# Patient Record
Sex: MALE | Race: WHITE | NOT HISPANIC OR LATINO | URBAN - METROPOLITAN AREA
[De-identification: names, ages, dates, MRNs, and addresses within clinical notes are randomized per-mention and may not be internally consistent; named-entity substitution may affect disease eponyms.]

---

## 2017-02-10 ENCOUNTER — OUTPATIENT (OUTPATIENT)
Dept: OUTPATIENT SERVICES | Facility: HOSPITAL | Age: 52
LOS: 1 days | End: 2017-02-10
Payer: COMMERCIAL

## 2017-02-10 DIAGNOSIS — Z01.818 ENCOUNTER FOR OTHER PREPROCEDURAL EXAMINATION: ICD-10-CM

## 2017-02-10 DIAGNOSIS — Z01.812 ENCOUNTER FOR PREPROCEDURAL LABORATORY EXAMINATION: ICD-10-CM

## 2017-02-10 DIAGNOSIS — Z01.810 ENCOUNTER FOR PREPROCEDURAL CARDIOVASCULAR EXAMINATION: ICD-10-CM

## 2017-02-10 DIAGNOSIS — Z30.2 ENCOUNTER FOR STERILIZATION: ICD-10-CM

## 2017-02-10 DIAGNOSIS — E11.9 TYPE 2 DIABETES MELLITUS WITHOUT COMPLICATIONS: ICD-10-CM

## 2017-02-10 LAB
ANION GAP SERPL CALC-SCNC: 18 MMOL/L — HIGH (ref 5–17)
BUN SERPL-MCNC: 21 MG/DL — SIGNIFICANT CHANGE UP (ref 7–23)
CALCIUM SERPL-MCNC: 10.5 MG/DL — SIGNIFICANT CHANGE UP (ref 8.4–10.5)
CHLORIDE SERPL-SCNC: 92 MMOL/L — LOW (ref 96–108)
CO2 SERPL-SCNC: 25 MMOL/L — SIGNIFICANT CHANGE UP (ref 22–31)
CREAT SERPL-MCNC: 0.91 MG/DL — SIGNIFICANT CHANGE UP (ref 0.5–1.3)
GLUCOSE SERPL-MCNC: 202 MG/DL — HIGH (ref 70–99)
HBA1C BLD-MCNC: 8.9 % — HIGH (ref 4–5.6)
HCT VFR BLD CALC: 44 % — SIGNIFICANT CHANGE UP (ref 39–50)
HGB BLD-MCNC: 15.3 G/DL — SIGNIFICANT CHANGE UP (ref 13–17)
MCHC RBC-ENTMCNC: 28.4 PG — SIGNIFICANT CHANGE UP (ref 27–34)
MCHC RBC-ENTMCNC: 34.8 GM/DL — SIGNIFICANT CHANGE UP (ref 32–36)
MCV RBC AUTO: 81.6 FL — SIGNIFICANT CHANGE UP (ref 80–100)
PLATELET # BLD AUTO: 345 K/UL — SIGNIFICANT CHANGE UP (ref 150–400)
POTASSIUM SERPL-MCNC: 4.4 MMOL/L — SIGNIFICANT CHANGE UP (ref 3.5–5.3)
POTASSIUM SERPL-SCNC: 4.4 MMOL/L — SIGNIFICANT CHANGE UP (ref 3.5–5.3)
RBC # BLD: 5.39 M/UL — SIGNIFICANT CHANGE UP (ref 4.2–5.8)
RBC # FLD: 12.5 % — SIGNIFICANT CHANGE UP (ref 10.3–14.5)
SODIUM SERPL-SCNC: 135 MMOL/L — SIGNIFICANT CHANGE UP (ref 135–145)
WBC # BLD: 7.24 K/UL — SIGNIFICANT CHANGE UP (ref 3.8–10.5)
WBC # FLD AUTO: 7.24 K/UL — SIGNIFICANT CHANGE UP (ref 3.8–10.5)

## 2017-02-10 PROCEDURE — 36415 COLL VENOUS BLD VENIPUNCTURE: CPT

## 2017-02-10 PROCEDURE — 85027 COMPLETE CBC AUTOMATED: CPT

## 2017-02-10 PROCEDURE — 83036 HEMOGLOBIN GLYCOSYLATED A1C: CPT

## 2017-02-10 PROCEDURE — 80048 BASIC METABOLIC PNL TOTAL CA: CPT

## 2017-02-10 PROCEDURE — G0463: CPT

## 2017-02-17 ENCOUNTER — OUTPATIENT (OUTPATIENT)
Dept: OUTPATIENT SERVICES | Facility: HOSPITAL | Age: 52
LOS: 1 days | End: 2017-02-17
Payer: COMMERCIAL

## 2017-02-17 DIAGNOSIS — I24.9 ACUTE ISCHEMIC HEART DISEASE, UNSPECIFIED: ICD-10-CM

## 2017-02-17 PROCEDURE — 93017 CV STRESS TEST TRACING ONLY: CPT

## 2017-02-17 PROCEDURE — A9502: CPT

## 2017-02-17 PROCEDURE — 78452 HT MUSCLE IMAGE SPECT MULT: CPT

## 2017-03-02 ENCOUNTER — INPATIENT (INPATIENT)
Facility: HOSPITAL | Age: 52
LOS: 0 days | Discharge: HOME | DRG: 246 | End: 2017-03-03
Attending: INTERNAL MEDICINE | Admitting: INTERNAL MEDICINE
Payer: COMMERCIAL

## 2017-03-02 VITALS
DIASTOLIC BLOOD PRESSURE: 57 MMHG | TEMPERATURE: 98 F | HEIGHT: 69 IN | HEART RATE: 59 BPM | SYSTOLIC BLOOD PRESSURE: 119 MMHG | WEIGHT: 175.05 LBS | OXYGEN SATURATION: 98 % | RESPIRATION RATE: 18 BRPM

## 2017-03-02 DIAGNOSIS — R94.39 ABNORMAL RESULT OF OTHER CARDIOVASCULAR FUNCTION STUDY: ICD-10-CM

## 2017-03-02 DIAGNOSIS — Z30.2 ENCOUNTER FOR STERILIZATION: ICD-10-CM

## 2017-03-02 DIAGNOSIS — Z01.818 ENCOUNTER FOR OTHER PREPROCEDURAL EXAMINATION: ICD-10-CM

## 2017-03-02 DIAGNOSIS — H53.009 UNSPECIFIED AMBLYOPIA, UNSPECIFIED EYE: Chronic | ICD-10-CM

## 2017-03-02 LAB
ALBUMIN SERPL ELPH-MCNC: 4.7 G/DL — SIGNIFICANT CHANGE UP (ref 3.3–5)
ALP SERPL-CCNC: 56 U/L — SIGNIFICANT CHANGE UP (ref 40–120)
ALT FLD-CCNC: 33 U/L RC — SIGNIFICANT CHANGE UP (ref 10–45)
ANION GAP SERPL CALC-SCNC: 14 MMOL/L — SIGNIFICANT CHANGE UP (ref 5–17)
AST SERPL-CCNC: 33 U/L — SIGNIFICANT CHANGE UP (ref 10–40)
BILIRUB SERPL-MCNC: 1 MG/DL — SIGNIFICANT CHANGE UP (ref 0.2–1.2)
BUN SERPL-MCNC: 18 MG/DL — SIGNIFICANT CHANGE UP (ref 7–23)
CALCIUM SERPL-MCNC: 9.4 MG/DL — SIGNIFICANT CHANGE UP (ref 8.4–10.5)
CHLORIDE SERPL-SCNC: 99 MMOL/L — SIGNIFICANT CHANGE UP (ref 96–108)
CO2 SERPL-SCNC: 28 MMOL/L — SIGNIFICANT CHANGE UP (ref 22–31)
CREAT SERPL-MCNC: 1 MG/DL — SIGNIFICANT CHANGE UP (ref 0.5–1.3)
GLUCOSE SERPL-MCNC: 126 MG/DL — HIGH (ref 70–99)
HCT VFR BLD CALC: 44.8 % — SIGNIFICANT CHANGE UP (ref 39–50)
HGB BLD-MCNC: 15.3 G/DL — SIGNIFICANT CHANGE UP (ref 13–17)
MCHC RBC-ENTMCNC: 29.1 PG — SIGNIFICANT CHANGE UP (ref 27–34)
MCHC RBC-ENTMCNC: 34.2 GM/DL — SIGNIFICANT CHANGE UP (ref 32–36)
MCV RBC AUTO: 84.9 FL — SIGNIFICANT CHANGE UP (ref 80–100)
PLATELET # BLD AUTO: 288 K/UL — SIGNIFICANT CHANGE UP (ref 150–400)
POTASSIUM SERPL-MCNC: 4.1 MMOL/L — SIGNIFICANT CHANGE UP (ref 3.5–5.3)
POTASSIUM SERPL-SCNC: 4.1 MMOL/L — SIGNIFICANT CHANGE UP (ref 3.5–5.3)
PROT SERPL-MCNC: 7.1 G/DL — SIGNIFICANT CHANGE UP (ref 6–8.3)
RBC # BLD: 5.28 M/UL — SIGNIFICANT CHANGE UP (ref 4.2–5.8)
RBC # FLD: 11.7 % — SIGNIFICANT CHANGE UP (ref 10.3–14.5)
SODIUM SERPL-SCNC: 141 MMOL/L — SIGNIFICANT CHANGE UP (ref 135–145)
WBC # BLD: 6 K/UL — SIGNIFICANT CHANGE UP (ref 3.8–10.5)
WBC # FLD AUTO: 6 K/UL — SIGNIFICANT CHANGE UP (ref 3.8–10.5)

## 2017-03-02 PROCEDURE — 93010 ELECTROCARDIOGRAM REPORT: CPT

## 2017-03-02 RX ORDER — DEXTROSE 50 % IN WATER 50 %
25 SYRINGE (ML) INTRAVENOUS ONCE
Qty: 0 | Refills: 0 | Status: DISCONTINUED | OUTPATIENT
Start: 2017-03-02 | End: 2017-03-03

## 2017-03-02 RX ORDER — SODIUM CHLORIDE 9 MG/ML
3 INJECTION INTRAMUSCULAR; INTRAVENOUS; SUBCUTANEOUS EVERY 8 HOURS
Qty: 0 | Refills: 0 | Status: DISCONTINUED | OUTPATIENT
Start: 2017-03-02 | End: 2017-03-03

## 2017-03-02 RX ORDER — GLUCAGON INJECTION, SOLUTION 0.5 MG/.1ML
1 INJECTION, SOLUTION SUBCUTANEOUS ONCE
Qty: 0 | Refills: 0 | Status: DISCONTINUED | OUTPATIENT
Start: 2017-03-02 | End: 2017-03-03

## 2017-03-02 RX ORDER — LISINOPRIL 2.5 MG/1
10 TABLET ORAL DAILY
Qty: 0 | Refills: 0 | Status: DISCONTINUED | OUTPATIENT
Start: 2017-03-02 | End: 2017-03-03

## 2017-03-02 RX ORDER — PRASUGREL 5 MG/1
10 TABLET, FILM COATED ORAL ONCE
Qty: 0 | Refills: 0 | Status: COMPLETED | OUTPATIENT
Start: 2017-03-03 | End: 2017-03-03

## 2017-03-02 RX ORDER — SODIUM CHLORIDE 9 MG/ML
1000 INJECTION, SOLUTION INTRAVENOUS
Qty: 0 | Refills: 0 | Status: DISCONTINUED | OUTPATIENT
Start: 2017-03-02 | End: 2017-03-03

## 2017-03-02 RX ORDER — PRASUGREL 5 MG/1
1 TABLET, FILM COATED ORAL
Qty: 90 | Refills: 3 | OUTPATIENT
Start: 2017-03-02 | End: 2018-02-24

## 2017-03-02 RX ORDER — INSULIN LISPRO 100/ML
VIAL (ML) SUBCUTANEOUS
Qty: 0 | Refills: 0 | Status: DISCONTINUED | OUTPATIENT
Start: 2017-03-02 | End: 2017-03-03

## 2017-03-02 RX ORDER — ASPIRIN/CALCIUM CARB/MAGNESIUM 324 MG
81 TABLET ORAL DAILY
Qty: 0 | Refills: 0 | Status: DISCONTINUED | OUTPATIENT
Start: 2017-03-02 | End: 2017-03-03

## 2017-03-02 RX ORDER — DEXTROSE 50 % IN WATER 50 %
1 SYRINGE (ML) INTRAVENOUS ONCE
Qty: 0 | Refills: 0 | Status: DISCONTINUED | OUTPATIENT
Start: 2017-03-02 | End: 2017-03-03

## 2017-03-02 RX ORDER — INSULIN GLARGINE 100 [IU]/ML
24 INJECTION, SOLUTION SUBCUTANEOUS AT BEDTIME
Qty: 0 | Refills: 0 | Status: DISCONTINUED | OUTPATIENT
Start: 2017-03-02 | End: 2017-03-03

## 2017-03-02 RX ORDER — METOPROLOL TARTRATE 50 MG
25 TABLET ORAL DAILY
Qty: 0 | Refills: 0 | Status: DISCONTINUED | OUTPATIENT
Start: 2017-03-02 | End: 2017-03-03

## 2017-03-02 RX ORDER — DEXTROSE 50 % IN WATER 50 %
12.5 SYRINGE (ML) INTRAVENOUS ONCE
Qty: 0 | Refills: 0 | Status: DISCONTINUED | OUTPATIENT
Start: 2017-03-02 | End: 2017-03-03

## 2017-03-02 RX ORDER — ATORVASTATIN CALCIUM 80 MG/1
80 TABLET, FILM COATED ORAL AT BEDTIME
Qty: 0 | Refills: 0 | Status: DISCONTINUED | OUTPATIENT
Start: 2017-03-02 | End: 2017-03-03

## 2017-03-02 RX ORDER — INSULIN LISPRO 100/ML
VIAL (ML) SUBCUTANEOUS AT BEDTIME
Qty: 0 | Refills: 0 | Status: DISCONTINUED | OUTPATIENT
Start: 2017-03-02 | End: 2017-03-03

## 2017-03-02 RX ADMIN — SODIUM CHLORIDE 3 MILLILITER(S): 9 INJECTION INTRAMUSCULAR; INTRAVENOUS; SUBCUTANEOUS at 14:35

## 2017-03-02 RX ADMIN — Medication 2: at 18:26

## 2017-03-02 RX ADMIN — INSULIN GLARGINE 24 UNIT(S): 100 INJECTION, SOLUTION SUBCUTANEOUS at 22:28

## 2017-03-02 RX ADMIN — SODIUM CHLORIDE 3 MILLILITER(S): 9 INJECTION INTRAMUSCULAR; INTRAVENOUS; SUBCUTANEOUS at 22:29

## 2017-03-02 RX ADMIN — ATORVASTATIN CALCIUM 80 MILLIGRAM(S): 80 TABLET, FILM COATED ORAL at 22:27

## 2017-03-02 NOTE — DISCHARGE NOTE ADULT - CARE PLAN
Principal Discharge DX:	Coronary artery disease of native artery of native heart with stable angina pectoris  Goal:	Patient remains chest pain free and understands post cath discharge instructions.  Instructions for follow-up, activity and diet:	No heavy lifting or pushing/pulling with procedure arm for 2 weeks. No driving for 2 days. You may shower 24 hours following the procedure but avoid baths/swimming for 1 week. Check your wrist site for bleeding and/or swelling daily following procedure and call your doctor immediately if it occurs or if you experience increased pain at the site. Follow up with your cardiologist in 1-2 weeks. You may call [facility] if you have any questions/concerns regarding your procedure [phone]. Do not stop you Aspirin or Effient unless instructed to do so by your cardiologist. Low salt, low fat diet.   Weight management.   Take medications as prescribed.    No smoking.  Follow up appointments with your doctor(s)  as instructed.  Secondary Diagnosis:	HTN (hypertension), benign  Goal:	Your blood pressure will be controlled.  Instructions for follow-up, activity and diet:	Continue with your blood pressure medications; eat a heart healthy diet with low salt diet; exercise regularly (consult with your physician or cardiologist first); maintain a heart healthy weight; if you smoke - quit (A resource to help you stop smoking is the [facility] – phone number 953-171-8494.); include healthy ways to manage stress. Continue to follow with your primary care physician or cardiologist.  Secondary Diagnosis:	Type 2 diabetes mellitus without complication, with long-term current use of insulin  Goal:	Your hemoglobin A1C will be between 7-8  Instructions for follow-up, activity and diet:	Continue to follow with your primary care MD or your endocrinologist.  Follow a heart healthy diabetic diet. If you check your fingerstick glucose at home, call your MD if it is greater than 250mg/dL on 2 occasions or less than 100mg/dL on 2 occasions. Know signs of low blood sugar, such as: dizziness, shakiness, sweating, confusion, hunger, nervousness-drink 4 ounces apple juice if occurs and call your doctor. Know early signs of high blood sugar, such as: frequent urination, increased thirst, blurry vision, fatigue, headache - call your doctor if this occurs. Follow with other practitioners to care for your diabetes, such as ophthamologist and podiatrist.  Secondary Diagnosis:	Hyperlipidemia, unspecified hyperlipidemia type Principal Discharge DX:	Coronary artery disease of native artery of native heart with stable angina pectoris  Goal:	Patient remains chest pain free and understands post cath discharge instructions.  Instructions for follow-up, activity and diet:	No heavy lifting or pushing/pulling with procedure arm for 2 weeks. No driving for 2 days. You may shower 24 hours following the procedure but avoid baths/swimming for 1 week. Check your wrist site for bleeding and/or swelling daily following procedure and call your doctor immediately if it occurs or if you experience increased pain at the site. Follow up with your cardiologist in 1-2 weeks. You may call [facility] if you have any questions/concerns regarding your procedure [phone]. Do not stop you Aspirin or Effient unless instructed to do so by your cardiologist. Low salt, low fat diet.   Weight management.   Take medications as prescribed.    No smoking.  Follow up appointments with your doctor(s)  as instructed.  Secondary Diagnosis:	HTN (hypertension), benign  Goal:	Your blood pressure will be controlled.  Instructions for follow-up, activity and diet:	Continue with your blood pressure medications; eat a heart healthy diet with low salt diet; exercise regularly (consult with your physician or cardiologist first); maintain a heart healthy weight; if you smoke - quit (A resource to help you stop smoking is the [facility] – phone number 748-424-4514.); include healthy ways to manage stress. Continue to follow with your primary care physician or cardiologist.  Secondary Diagnosis:	Type 2 diabetes mellitus without complication, with long-term current use of insulin  Goal:	Your hemoglobin A1C will be between 7-8  Instructions for follow-up, activity and diet:	Continue to follow with your primary care MD or your endocrinologist.  Follow a heart healthy diabetic diet. If you check your fingerstick glucose at home, call your MD if it is greater than 250mg/dL on 2 occasions or less than 100mg/dL on 2 occasions. Know signs of low blood sugar, such as: dizziness, shakiness, sweating, confusion, hunger, nervousness-drink 4 ounces apple juice if occurs and call your doctor. Know early signs of high blood sugar, such as: frequent urination, increased thirst, blurry vision, fatigue, headache - call your doctor if this occurs. Follow with other practitioners to care for your diabetes, such as ophthamologist and podiatrist.  Secondary Diagnosis:	Hyperlipidemia, unspecified hyperlipidemia type Principal Discharge DX:	Coronary artery disease of native artery of native heart with stable angina pectoris  Goal:	Patient remains chest pain free and understands post cath discharge instructions.  Instructions for follow-up, activity and diet:	No heavy lifting or pushing/pulling with procedure arm for 2 weeks. No driving for 2 days. You may shower 24 hours following the procedure but avoid baths/swimming for 1 week. Check your wrist site for bleeding and/or swelling daily following procedure and call your doctor immediately if it occurs or if you experience increased pain at the site. Follow up with your cardiologist in 1-2 weeks. You may call [facility] if you have any questions/concerns regarding your procedure [phone]. Do not stop you Aspirin or Effient unless instructed to do so by your cardiologist. Low salt, low fat diet.   Weight management.   Take medications as prescribed.    No smoking.  Follow up appointments with your doctor(s)  as instructed.  Secondary Diagnosis:	HTN (hypertension), benign  Goal:	Your blood pressure will be controlled.  Instructions for follow-up, activity and diet:	Continue with your blood pressure medications; eat a heart healthy diet with low salt diet; exercise regularly (consult with your physician or cardiologist first); maintain a heart healthy weight; if you smoke - quit (A resource to help you stop smoking is the [facility] – phone number [phone].); include healthy ways to manage stress. Continue to follow with your primary care physician or cardiologist.  Secondary Diagnosis:	Type 2 diabetes mellitus without complication, with long-term current use of insulin  Goal:	Your hemoglobin A1C will be between 7-8  Instructions for follow-up, activity and diet:	Continue to follow with your primary care MD or your endocrinologist.  Follow a heart healthy diabetic diet. If you check your fingerstick glucose at home, call your MD if it is greater than 250mg/dL on 2 occasions or less than 100mg/dL on 2 occasions. Know signs of low blood sugar, such as: dizziness, shakiness, sweating, confusion, hunger, nervousness-drink 4 ounces apple juice if occurs and call your doctor. Know early signs of high blood sugar, such as: frequent urination, increased thirst, blurry vision, fatigue, headache - call your doctor if this occurs. Follow with other practitioners to care for your diabetes, such as ophthamologist and podiatrist.  Secondary Diagnosis:	Hyperlipidemia, unspecified hyperlipidemia type Principal Discharge DX:	Coronary artery disease of native artery of native heart with stable angina pectoris  Goal:	Patient remains chest pain free and understands post cath discharge instructions.  Instructions for follow-up, activity and diet:	No heavy lifting or pushing/pulling with procedure arm for 2 weeks. No driving for 2 days. You may shower 24 hours following the procedure but avoid baths/swimming for 1 week. Check your wrist site for bleeding and/or swelling daily following procedure and call your doctor immediately if it occurs or if you experience increased pain at the site. Follow up with your cardiologist in 1-2 weeks. You may call [facility] if you have any questions/concerns regarding your procedure [phone]. Do not stop you Aspirin or Effient unless instructed to do so by your cardiologist. Low salt, low fat diet.   Weight management.   Take medications as prescribed.    No smoking.  Follow up appointments with your doctor(s)  as instructed.  Secondary Diagnosis:	HTN (hypertension), benign  Goal:	Your blood pressure will be controlled.  Instructions for follow-up, activity and diet:	Continue with your blood pressure medications; eat a heart healthy diet with low salt diet; exercise regularly (consult with your physician or cardiologist first); maintain a heart healthy weight; if you smoke - quit (A resource to help you stop smoking is the [facility] – phone number [phone].); include healthy ways to manage stress. Continue to follow with your primary care physician or cardiologist.  Secondary Diagnosis:	Type 2 diabetes mellitus without complication, with long-term current use of insulin  Goal:	Your hemoglobin A1C will be between 7-8  Instructions for follow-up, activity and diet:	Continue to follow with your primary care MD or your endocrinologist.  Follow a heart healthy diabetic diet. If you check your fingerstick glucose at home, call your MD if it is greater than 250mg/dL on 2 occasions or less than 100mg/dL on 2 occasions. Know signs of low blood sugar, such as: dizziness, shakiness, sweating, confusion, hunger, nervousness-drink 4 ounces apple juice if occurs and call your doctor. Know early signs of high blood sugar, such as: frequent urination, increased thirst, blurry vision, fatigue, headache - call your doctor if this occurs. Follow with other practitioners to care for your diabetes, such as ophthamologist and podiatrist.  Secondary Diagnosis:	Hyperlipidemia, unspecified hyperlipidemia type

## 2017-03-02 NOTE — DISCHARGE NOTE ADULT - PLAN OF CARE
Patient remains chest pain free and understands post cath discharge instructions. No heavy lifting or pushing/pulling with procedure arm for 2 weeks. No driving for 2 days. You may shower 24 hours following the procedure but avoid baths/swimming for 1 week. Check your wrist site for bleeding and/or swelling daily following procedure and call your doctor immediately if it occurs or if you experience increased pain at the site. Follow up with your cardiologist in 1-2 weeks. You may call [facility] if you have any questions/concerns regarding your procedure [phone]. Do not stop you Aspirin or Effient unless instructed to do so by your cardiologist. Low salt, low fat diet.   Weight management.   Take medications as prescribed.    No smoking.  Follow up appointments with your doctor(s)  as instructed. Your blood pressure will be controlled. Continue with your blood pressure medications; eat a heart healthy diet with low salt diet; exercise regularly (consult with your physician or cardiologist first); maintain a heart healthy weight; if you smoke - quit (A resource to help you stop smoking is the [facility] – phone number [phone].); include healthy ways to manage stress. Continue to follow with your primary care physician or cardiologist. Your hemoglobin A1C will be between 7-8 Continue to follow with your primary care MD or your endocrinologist.  Follow a heart healthy diabetic diet. If you check your fingerstick glucose at home, call your MD if it is greater than 250mg/dL on 2 occasions or less than 100mg/dL on 2 occasions. Know signs of low blood sugar, such as: dizziness, shakiness, sweating, confusion, hunger, nervousness-drink 4 ounces apple juice if occurs and call your doctor. Know early signs of high blood sugar, such as: frequent urination, increased thirst, blurry vision, fatigue, headache - call your doctor if this occurs. Follow with other practitioners to care for your diabetes, such as ophthamologist and podiatrist.

## 2017-03-02 NOTE — DISCHARGE NOTE ADULT - SECONDARY DIAGNOSIS.
HTN (hypertension), benign Type 2 diabetes mellitus without complication, with long-term current use of insulin Hyperlipidemia, unspecified hyperlipidemia type

## 2017-03-02 NOTE — DISCHARGE NOTE ADULT - MEDICATION SUMMARY - MEDICATIONS TO TAKE
I will START or STAY ON the medications listed below when I get home from the hospital:    Aspirin Enteric Coated 81 mg oral delayed release tablet  -- 1 tab(s) by mouth once a day  -- Indication: For stented coronary artery    ramipril 2.5 mg oral capsule  -- 1 cap(s) by mouth once a day  -- Indication: For Hypertension    Janumet 50 mg-1000 mg oral tablet  -- 1 tab(s) by mouth 2 times a day. DO NOT TAKE ON 3/3 or 3/4, restart on 3/5.  -- Indication: For Diabetes type 2    Lantus Solostar Pen 100 units/mL subcutaneous solution  -- 30 unit(s) subcutaneous once a day (at bedtime)  -- Indication: For Diabetes type 2    atorvastatin 20 mg oral tablet  -- 1 tab(s) by mouth once a day  -- Indication: For Hyperlipidemia    prasugrel 10 mg oral tablet  -- 1 tab(s) by mouth once a day  -- Indication: For stented coronary artery    metoprolol succinate 25 mg oral tablet, extended release  -- 1 tab(s) by mouth once a day  -- Indication: For Hypertension

## 2017-03-02 NOTE — H&P CARDIOLOGY - HISTORY OF PRESENT ILLNESS
51 year old male pt with PMHx of HTN, HLD, DMT2(last A1C 8.9 on 2/10/2017) who presents for cardiac cath. Pt reports that he has been experiencing bilateral shoulder pain evaluated by PCP Dr. SCARLETT Rucker and referred for cardiologist Dr. Rich.   Stress test revealed medium sized moderate ro severe defect in the basal to inferior wall that is predominantly fixed consistent with infarction with minimal carlos infarct ischemia. EF 44%

## 2017-03-02 NOTE — DISCHARGE NOTE ADULT - CARE PROVIDER_API CALL
Carlos Rucker), Cardiovascular Disease; Internal Medicine  [address] [place] [address]  Phone: [phone]  Fax: [phone]

## 2017-03-02 NOTE — DISCHARGE NOTE ADULT - PATIENT PORTAL LINK FT
“You can access the Follow[facility] Patient Portal, offered by [facility], by registering with the following website: [email or web address]”

## 2017-03-03 VITALS
OXYGEN SATURATION: 97 % | RESPIRATION RATE: 18 BRPM | TEMPERATURE: 99 F | HEART RATE: 63 BPM | DIASTOLIC BLOOD PRESSURE: 68 MMHG | SYSTOLIC BLOOD PRESSURE: 115 MMHG

## 2017-03-03 LAB
ANION GAP SERPL CALC-SCNC: 12 MMOL/L — SIGNIFICANT CHANGE UP (ref 5–17)
BUN SERPL-MCNC: 16 MG/DL — SIGNIFICANT CHANGE UP (ref 7–23)
CALCIUM SERPL-MCNC: 9 MG/DL — SIGNIFICANT CHANGE UP (ref 8.4–10.5)
CHLORIDE SERPL-SCNC: 102 MMOL/L — SIGNIFICANT CHANGE UP (ref 96–108)
CO2 SERPL-SCNC: 26 MMOL/L — SIGNIFICANT CHANGE UP (ref 22–31)
CREAT SERPL-MCNC: 0.92 MG/DL — SIGNIFICANT CHANGE UP (ref 0.5–1.3)
GLUCOSE SERPL-MCNC: 151 MG/DL — HIGH (ref 70–99)
HCT VFR BLD CALC: 44.6 % — SIGNIFICANT CHANGE UP (ref 39–50)
HGB BLD-MCNC: 15.3 G/DL — SIGNIFICANT CHANGE UP (ref 13–17)
MCHC RBC-ENTMCNC: 28.8 PG — SIGNIFICANT CHANGE UP (ref 27–34)
MCHC RBC-ENTMCNC: 34.3 GM/DL — SIGNIFICANT CHANGE UP (ref 32–36)
MCV RBC AUTO: 84 FL — SIGNIFICANT CHANGE UP (ref 80–100)
PLATELET # BLD AUTO: 231 K/UL — SIGNIFICANT CHANGE UP (ref 150–400)
POTASSIUM SERPL-MCNC: 3.7 MMOL/L — SIGNIFICANT CHANGE UP (ref 3.5–5.3)
POTASSIUM SERPL-SCNC: 3.7 MMOL/L — SIGNIFICANT CHANGE UP (ref 3.5–5.3)
RBC # BLD: 5.3 M/UL — SIGNIFICANT CHANGE UP (ref 4.2–5.8)
RBC # FLD: 12.1 % — SIGNIFICANT CHANGE UP (ref 10.3–14.5)
SODIUM SERPL-SCNC: 140 MMOL/L — SIGNIFICANT CHANGE UP (ref 135–145)
WBC # BLD: 5.6 K/UL — SIGNIFICANT CHANGE UP (ref 3.8–10.5)
WBC # FLD AUTO: 5.6 K/UL — SIGNIFICANT CHANGE UP (ref 3.8–10.5)

## 2017-03-03 PROCEDURE — C1725: CPT

## 2017-03-03 PROCEDURE — 93010 ELECTROCARDIOGRAM REPORT: CPT

## 2017-03-03 PROCEDURE — 80053 COMPREHEN METABOLIC PANEL: CPT

## 2017-03-03 PROCEDURE — C9600: CPT | Mod: RC

## 2017-03-03 PROCEDURE — C1887: CPT

## 2017-03-03 PROCEDURE — C1769: CPT

## 2017-03-03 PROCEDURE — C1894: CPT

## 2017-03-03 PROCEDURE — C1874: CPT

## 2017-03-03 PROCEDURE — 80048 BASIC METABOLIC PNL TOTAL CA: CPT

## 2017-03-03 PROCEDURE — 85027 COMPLETE CBC AUTOMATED: CPT

## 2017-03-03 PROCEDURE — 93458 L HRT ARTERY/VENTRICLE ANGIO: CPT | Mod: 59

## 2017-03-03 PROCEDURE — 93005 ELECTROCARDIOGRAM TRACING: CPT

## 2017-03-03 RX ORDER — PRASUGREL 5 MG/1
10 TABLET, FILM COATED ORAL DAILY
Qty: 0 | Refills: 0 | Status: DISCONTINUED | OUTPATIENT
Start: 2017-03-03 | End: 2017-03-03

## 2017-03-03 RX ADMIN — SODIUM CHLORIDE 3 MILLILITER(S): 9 INJECTION INTRAMUSCULAR; INTRAVENOUS; SUBCUTANEOUS at 05:41

## 2017-03-03 RX ADMIN — LISINOPRIL 10 MILLIGRAM(S): 2.5 TABLET ORAL at 05:39

## 2017-03-03 RX ADMIN — Medication 81 MILLIGRAM(S): at 05:39

## 2017-03-03 RX ADMIN — Medication 25 MILLIGRAM(S): at 05:39

## 2017-03-03 RX ADMIN — PRASUGREL 10 MILLIGRAM(S): 5 TABLET, FILM COATED ORAL at 05:39

## 2017-03-03 RX ADMIN — Medication 1: at 07:37

## 2017-09-16 PROBLEM — E78.5 HYPERLIPIDEMIA, UNSPECIFIED: Chronic | Status: ACTIVE | Noted: 2017-03-02

## 2017-09-16 PROBLEM — I10 ESSENTIAL (PRIMARY) HYPERTENSION: Chronic | Status: ACTIVE | Noted: 2017-03-02

## 2017-09-20 ENCOUNTER — OUTPATIENT (OUTPATIENT)
Dept: OUTPATIENT SERVICES | Facility: HOSPITAL | Age: 52
LOS: 1 days | End: 2017-09-20
Payer: COMMERCIAL

## 2017-09-20 VITALS
SYSTOLIC BLOOD PRESSURE: 112 MMHG | HEART RATE: 64 BPM | HEIGHT: 69 IN | RESPIRATION RATE: 12 BRPM | TEMPERATURE: 98 F | WEIGHT: 171.96 LBS | DIASTOLIC BLOOD PRESSURE: 75 MMHG | OXYGEN SATURATION: 99 %

## 2017-09-20 DIAGNOSIS — Z30.2 ENCOUNTER FOR STERILIZATION: ICD-10-CM

## 2017-09-20 DIAGNOSIS — H53.009 UNSPECIFIED AMBLYOPIA, UNSPECIFIED EYE: Chronic | ICD-10-CM

## 2017-09-20 DIAGNOSIS — I25.10 ATHEROSCLEROTIC HEART DISEASE OF NATIVE CORONARY ARTERY WITHOUT ANGINA PECTORIS: ICD-10-CM

## 2017-09-20 DIAGNOSIS — Z95.5 PRESENCE OF CORONARY ANGIOPLASTY IMPLANT AND GRAFT: Chronic | ICD-10-CM

## 2017-09-20 DIAGNOSIS — I21.3 ST ELEVATION (STEMI) MYOCARDIAL INFARCTION OF UNSPECIFIED SITE: ICD-10-CM

## 2017-09-20 DIAGNOSIS — Z01.818 ENCOUNTER FOR OTHER PREPROCEDURAL EXAMINATION: ICD-10-CM

## 2017-09-20 PROCEDURE — 85027 COMPLETE CBC AUTOMATED: CPT

## 2017-09-20 PROCEDURE — 83036 HEMOGLOBIN GLYCOSYLATED A1C: CPT

## 2017-09-20 PROCEDURE — 93005 ELECTROCARDIOGRAM TRACING: CPT

## 2017-09-20 PROCEDURE — G0463: CPT

## 2017-09-20 PROCEDURE — 80048 BASIC METABOLIC PNL TOTAL CA: CPT

## 2017-09-20 PROCEDURE — 93010 ELECTROCARDIOGRAM REPORT: CPT

## 2017-09-20 RX ORDER — ACETAMINOPHEN 500 MG
975 TABLET ORAL ONCE
Qty: 0 | Refills: 0 | Status: COMPLETED | OUTPATIENT
Start: 2017-09-28 | End: 2017-09-28

## 2017-09-20 RX ORDER — LIDOCAINE HCL 20 MG/ML
0.2 VIAL (ML) INJECTION ONCE
Qty: 0 | Refills: 0 | Status: DISCONTINUED | OUTPATIENT
Start: 2017-09-28 | End: 2017-10-13

## 2017-09-20 RX ORDER — SODIUM CHLORIDE 9 MG/ML
3 INJECTION INTRAMUSCULAR; INTRAVENOUS; SUBCUTANEOUS EVERY 8 HOURS
Qty: 0 | Refills: 0 | Status: DISCONTINUED | OUTPATIENT
Start: 2017-09-28 | End: 2017-10-13

## 2017-09-20 NOTE — H&P PST ADULT - NSANTHOSAYNRD_GEN_A_CORE
No. NASRIN screening performed.  STOP BANG Legend: 0-2 = LOW Risk; 3-4 = INTERMEDIATE Risk; 5-8 = HIGH Risk

## 2017-09-20 NOTE — H&P PST ADULT - HISTORY OF PRESENT ILLNESS
52 yr old male with encounter for sterilization coming in for   Bilat vasectomy. Pt has Hx of Diabetes with stents x4 placed  on 2/2017 he will remain on Effient and baby ASA

## 2017-09-20 NOTE — H&P PST ADULT - PROBLEM SELECTOR PLAN 2
pt with recent stents 8mo will remain on Effient and Asa pt with recent stents 8mo will obtain cardiac eval and plan for Effient with Dr. Rucker  [phone]. pt made aware to make Appt

## 2017-09-20 NOTE — H&P PST ADULT - PMH
CAD (coronary artery disease)  stents x4 2/2017  DM (diabetes mellitus)  type 2 1995  HLD (hyperlipidemia)    HTN (hypertension)    MI (myocardial infarction)  12/2016 no damage

## 2017-09-28 ENCOUNTER — RESULT REVIEW (OUTPATIENT)
Age: 52
End: 2017-09-28

## 2017-09-28 ENCOUNTER — OUTPATIENT (OUTPATIENT)
Dept: OUTPATIENT SERVICES | Facility: HOSPITAL | Age: 52
LOS: 1 days | End: 2017-09-28
Payer: COMMERCIAL

## 2017-09-28 VITALS
OXYGEN SATURATION: 100 % | SYSTOLIC BLOOD PRESSURE: 120 MMHG | RESPIRATION RATE: 18 BRPM | HEART RATE: 59 BPM | TEMPERATURE: 97 F | DIASTOLIC BLOOD PRESSURE: 66 MMHG

## 2017-09-28 VITALS
TEMPERATURE: 98 F | OXYGEN SATURATION: 99 % | RESPIRATION RATE: 12 BRPM | WEIGHT: 171.96 LBS | HEIGHT: 69 IN | HEART RATE: 67 BPM | SYSTOLIC BLOOD PRESSURE: 142 MMHG | DIASTOLIC BLOOD PRESSURE: 82 MMHG

## 2017-09-28 DIAGNOSIS — Z95.5 PRESENCE OF CORONARY ANGIOPLASTY IMPLANT AND GRAFT: Chronic | ICD-10-CM

## 2017-09-28 DIAGNOSIS — H53.009 UNSPECIFIED AMBLYOPIA, UNSPECIFIED EYE: Chronic | ICD-10-CM

## 2017-09-28 DIAGNOSIS — Z30.2 ENCOUNTER FOR STERILIZATION: ICD-10-CM

## 2017-09-28 PROCEDURE — 88302 TISSUE EXAM BY PATHOLOGIST: CPT | Mod: 26

## 2017-09-28 PROCEDURE — 88302 TISSUE EXAM BY PATHOLOGIST: CPT

## 2017-09-28 PROCEDURE — 55250 REMOVAL OF SPERM DUCT(S): CPT

## 2017-09-28 RX ORDER — ATORVASTATIN CALCIUM 80 MG/1
1 TABLET, FILM COATED ORAL
Qty: 0 | Refills: 0 | COMMUNITY

## 2017-09-28 RX ORDER — METOPROLOL TARTRATE 50 MG
1 TABLET ORAL
Qty: 0 | Refills: 0 | COMMUNITY

## 2017-09-28 RX ORDER — FAMOTIDINE 10 MG/ML
1 INJECTION INTRAVENOUS
Qty: 0 | Refills: 0 | COMMUNITY

## 2017-09-28 RX ORDER — ONDANSETRON 8 MG/1
4 TABLET, FILM COATED ORAL ONCE
Qty: 0 | Refills: 0 | Status: DISCONTINUED | OUTPATIENT
Start: 2017-09-28 | End: 2017-10-13

## 2017-09-28 RX ORDER — CELECOXIB 200 MG/1
200 CAPSULE ORAL ONCE
Qty: 0 | Refills: 0 | Status: COMPLETED | OUTPATIENT
Start: 2017-09-28 | End: 2017-09-28

## 2017-09-28 RX ORDER — OXYCODONE HYDROCHLORIDE 5 MG/1
10 TABLET ORAL ONCE
Qty: 0 | Refills: 0 | Status: DISCONTINUED | OUTPATIENT
Start: 2017-09-28 | End: 2017-09-28

## 2017-09-28 RX ORDER — RAMIPRIL 5 MG
1 CAPSULE ORAL
Qty: 0 | Refills: 0 | COMMUNITY

## 2017-09-28 RX ORDER — ASPIRIN/CALCIUM CARB/MAGNESIUM 324 MG
1 TABLET ORAL
Qty: 0 | Refills: 0 | COMMUNITY

## 2017-09-28 RX ORDER — ENOXAPARIN SODIUM 100 MG/ML
30 INJECTION SUBCUTANEOUS
Qty: 0 | Refills: 0 | COMMUNITY

## 2017-09-28 RX ORDER — SODIUM CHLORIDE 9 MG/ML
1000 INJECTION, SOLUTION INTRAVENOUS
Qty: 0 | Refills: 0 | Status: DISCONTINUED | OUTPATIENT
Start: 2017-09-28 | End: 2017-10-13

## 2017-09-28 RX ORDER — CELECOXIB 200 MG/1
200 CAPSULE ORAL ONCE
Qty: 0 | Refills: 0 | Status: DISCONTINUED | OUTPATIENT
Start: 2017-09-28 | End: 2017-10-13

## 2017-09-28 RX ADMIN — CELECOXIB 200 MILLIGRAM(S): 200 CAPSULE ORAL at 15:18

## 2017-09-28 RX ADMIN — Medication 975 MILLIGRAM(S): at 15:19

## 2017-09-28 NOTE — ASU DISCHARGE PLAN (ADULT/PEDIATRIC). - MEDICATION SUMMARY - MEDICATIONS TO TAKE
I will START or STAY ON the medications listed below when I get home from the hospital:    Aspirin Enteric Coated 81 mg oral delayed release tablet  -- 1 tab(s) by mouth once a day  -- Indication: For home medication    ramipril 2.5 mg oral capsule  -- 1 cap(s) by mouth once a day  -- Indication: For home medication    Janumet 50 mg-1000 mg oral tablet  -- 1 tab(s) by mouth 2 times a day. DO NOT TAKE ON 3/3 or 3/4, restart on 3/5.  -- Indication: For home medication    Lantus Solostar Pen 100 units/mL subcutaneous solution  -- 30 unit(s) subcutaneous once a day (at bedtime)  -- Indication: For home medication    atorvastatin 20 mg oral tablet  -- 1 tab(s) by mouth once a day  -- Indication: For home medication    prasugrel 10 mg oral tablet  -- 1 tab(s) by mouth once a day  -- Indication: For home medication    metoprolol succinate 25 mg oral tablet, extended release  -- 1 tab(s) by mouth once a day  -- Indication: For home medication    famotidine 20 mg oral tablet  -- 1 tab(s) by mouth once a day pm and am of surgery  -- Indication: For home medication

## 2017-09-28 NOTE — BRIEF OPERATIVE NOTE - PROCEDURE
<<-----Click on this checkbox to enter Procedure Vasectomy and ligation of vas deferens  09/28/2017    Active  CAROL

## 2017-09-28 NOTE — ASU PATIENT PROFILE, ADULT - VISION (WITH CORRECTIVE LENSES IF THE PATIENT USUALLY WEARS THEM):
corrective/Partially impaired: cannot see medication labels or newsprint, but can see obstacles in path, and the surrounding layout; can count fingers at arm's length

## 2017-09-28 NOTE — ASU DISCHARGE PLAN (ADULT/PEDIATRIC). - NOTIFY
Pain not relieved by Medications/Fever greater than 101/Inability to Tolerate Liquids or Foods/Persistent Nausea and Vomiting/Bleeding that does not stop/Unable to Urinate

## 2017-10-02 LAB — SURGICAL PATHOLOGY STUDY: SIGNIFICANT CHANGE UP

## 2018-02-05 NOTE — ASU PREOP CHECKLIST - IDENTIFICATION BAND VERIFIED
----- Message from Concepción Silverman sent at 2/5/2018  1:00 PM CST -----  She was wondering why her lab/injection is today. It's usually on the same day that she see's Richard. Does she need to reschedule todays appt.?   done

## 2021-11-30 PROBLEM — I21.3 ST ELEVATION (STEMI) MYOCARDIAL INFARCTION OF UNSPECIFIED SITE: Chronic | Status: ACTIVE | Noted: 2017-09-20

## 2021-11-30 PROBLEM — E11.9 TYPE 2 DIABETES MELLITUS WITHOUT COMPLICATIONS: Chronic | Status: ACTIVE | Noted: 2017-03-02

## 2021-11-30 PROBLEM — I25.10 ATHEROSCLEROTIC HEART DISEASE OF NATIVE CORONARY ARTERY WITHOUT ANGINA PECTORIS: Chronic | Status: ACTIVE | Noted: 2017-09-20

## 2021-12-16 ENCOUNTER — NON-APPOINTMENT (OUTPATIENT)
Age: 56
End: 2021-12-16

## 2021-12-17 PROBLEM — Z00.00 ENCOUNTER FOR PREVENTIVE HEALTH EXAMINATION: Status: ACTIVE | Noted: 2021-12-17

## 2022-01-26 ENCOUNTER — NON-APPOINTMENT (OUTPATIENT)
Age: 57
End: 2022-01-26

## 2022-01-26 ENCOUNTER — APPOINTMENT (OUTPATIENT)
Dept: DERMATOLOGY | Facility: CLINIC | Age: 57
End: 2022-01-26
Payer: COMMERCIAL

## 2022-01-26 PROCEDURE — 99204 OFFICE O/P NEW MOD 45 MIN: CPT

## 2022-03-14 ENCOUNTER — APPOINTMENT (OUTPATIENT)
Dept: DERMATOLOGY | Facility: CLINIC | Age: 57
End: 2022-03-14
Payer: COMMERCIAL

## 2022-03-14 ENCOUNTER — NON-APPOINTMENT (OUTPATIENT)
Age: 57
End: 2022-03-14

## 2022-03-14 PROCEDURE — 17313 MOHS 1 STAGE T/A/L: CPT

## 2022-03-14 PROCEDURE — 12032 INTMD RPR S/A/T/EXT 2.6-7.5: CPT

## 2022-03-21 ENCOUNTER — APPOINTMENT (OUTPATIENT)
Dept: DERMATOLOGY | Facility: CLINIC | Age: 57
End: 2022-03-21
Payer: COMMERCIAL

## 2022-03-21 DIAGNOSIS — C44.519 BASAL CELL CARCINOMA OF SKIN OF OTHER PART OF TRUNK: ICD-10-CM

## 2022-03-21 PROCEDURE — 99024 POSTOP FOLLOW-UP VISIT: CPT

## 2022-10-21 NOTE — DISCHARGE NOTE ADULT - HOSPITAL COURSE
"Subjective   Kristina Parson is a 22 y.o. female who presents today for initial evaluation     Referring Provider:  No referring provider defined for this encounter. Louis    Chief Complaint: Anxiety    History of Present Illness:     9/23: Chart review: Seen by primary care August 2.  Still has vertigo at times in the mornings, on meclizine.  She is on Ativan 1 mg every 8 hours.  Has had severe panic attacks recently and has gone to the emergency room twice for this.  Wellbutrin worsened her symptoms.  Presently cannot hold a job.  July labs show reassuring CMP, slightly elevated WBCs on CBC at 11.99.  TSH and free T4 normal, magnesium slightly elevated July 20 at 2.3, problems with elevated heart rate in the past, now on metoprolol 25 daily.  UDS negative.  CT of the head in April for dizziness is negative.  Presently on a Holter monitor.  July EKG shows rate 100, sinus tachycardia, .  PDMP confirms lorazepam since July.  A few notes in Care Everywhere.    Psychiatric history: Lexapro did not help, Paxil was restarted because it helped in the past.  Wellbutrin made things worse.    \"Blaire\"    10/21: Virtual visit via Zoom audio and video due to the COVID-19 pandemic.  Patient is accepting of and agreeable to visit.  The visit consisted of the patient and I. The patient is at home, and I am at the office.  Interview:  1. Chart review: Seen by urgent care recently for cough and sore throat, strep, mono, COVID-19 negative.  2. Planning: Increased Paxil, continued Ativan, consider gabapentin.  ADHD?  3. \"It helped.\"  a. \"Doing a lot better now.\"  b. Has never been on buspar  4. Mood/Depression: \"I'm not really depressed.\"  5. Anxiety:  a. Better, but there's still worrying, feeling on edge -- a little bit  6. Panic attacks: better  a. Hasn't been out around people  b. Takes ativan it twice a day scheduled  7. Energy: good  8. Concentration: good  9. Sleeping: good  10. Eating: stable  11. Refills: " "n  12. Substances: n  13. Therapy: n  14. Medication compliant: y  15. No SI HI AVH.      9/23: In person.  Interview:  16. Chart review: Consider a mood stabilizer like Abilify.  17. His/Her Story: \"I've had it for a couple years.\"  a. P?, G10  b. It just started; I have a little depression and abandonment issues from parents.  i. Parents were in and out of retirement  ii. Lived most of life with grandma  iii. Unsure how it connects with anxiety  c. 2 years ago: Dad went to jail and Mom went to retirement. Both gone for about a year or two.  i. Patient was in college at the time. Graduated. [employer].  ii. \"I don't think about it... I just hold it in.\" Of them being gone. \"It just hurts...\" (began to cry)  iii. Was living with gma, even so, was very difficult because \"we are very close.\"  d. Takes 0.75 bid  18. Depression/Mood:  a. All I want to do is sleep, but I get up at 6:30 am every morning  b. Possible terminal insomnia  c. Depressed mood, anhedonia, hopelessness or guilt, poor energy, poor concentration, possible psychomotor retardation.  d. Seasonal pattern: def  e. Severity: Moderate  f. Duration: for years now  19. Anxiety:  a. Anxious around big crowds  b. Can't keep a job for a week  1. Being around people  2. HR goes up and stays up  3. Panic attacks: increased HR, soa,   c. Uncontrolled worrying, fatigue, poor concentration, feeling on edge or restless, irritability.  d. Severity: Moderate  e. Duration: 2 years ago  20. Panic attacks: yes, on ativan  21. ADHD: neg  a. Elementary school:   i. Grades? good  ii. Special classes or failures? Possibly for reading  iii. Got in trouble?  denies  iv. Referral for ADHD testing? n  b. Fhx: denies  22. PTSD: neg  23. Psych ROS: Positive for depression, anxiety.  Negative for psychosis and shae.  24. No SI HI AVH.  25. Substances: vaping  26. Therapy: denies  27. Medication compliant: y    Access to Firearms: denies    PHQ-9 Depression Screening  PHQ-9 Total " Score:      Little interest or pleasure in doing things?     Feeling down, depressed, or hopeless?     Trouble falling or staying asleep, or sleeping too much?     Feeling tired or having little energy?     Poor appetite or overeating?     Feeling bad about yourself - or that you are a failure or have let yourself or your family down?     Trouble concentrating on things, such as reading the newspaper or watching television?     Moving or speaking so slowly that other people could have noticed? Or the opposite - being so fidgety or restless that you have been moving around a lot more than usual?     Thoughts that you would be better off dead, or of hurting yourself in some way?     PHQ-9 Total Score       POLI-7       Past Surgical History:  Past Surgical History:   Procedure Laterality Date   • FEMUR FRACTURE SURGERY  2014   • WISDOM TOOTH EXTRACTION  2020       Problem List:  Patient Active Problem List   Diagnosis   • Abnormal uterine bleeding (AUB)   • POLI (generalized anxiety disorder)   • Migraine   • Otitis media   • Seasonal allergic rhinitis due to pollen   • URI (upper respiratory infection)   • Encounter for surveillance of contraceptive pills   • BPPV (benign paroxysmal positional vertigo)   • Panic attack   • Tachycardia       Allergy:   Allergies   Allergen Reactions   • Wellbutrin [Bupropion] Palpitations        Discontinued Medications:  Medications Discontinued During This Encounter   Medication Reason   • fexofenadine (Allegra Allergy) 180 MG tablet *Therapy completed   • fluticasone (Flonase) 50 MCG/ACT nasal spray *Therapy completed   • guaiFENesin (Mucinex) 600 MG 12 hr tablet *Therapy completed   • PARoxetine (Paxil) 20 MG tablet Reorder       Current Medications:   Current Outpatient Medications   Medication Sig Dispense Refill   • LORazepam (Ativan) 0.5 MG tablet Take 1.5 tablets by mouth Every 8 (Eight) Hours As Needed for Anxiety. 135 tablet 2   • metoprolol succinate XL (TOPROL-XL) 25 MG 24  hr tablet Take 1 tablet by mouth Daily. 90 tablet 3   • PARoxetine (Paxil) 30 MG tablet Take 1 tablet by mouth Every Morning. 30 tablet 2     No current facility-administered medications for this visit.       Past Medical History:  Past Medical History:   Diagnosis Date   • Anxiety    • Closed displaced fracture of fifth metatarsal bone 2018   • Femur fracture (HCC)    • POLI (generalized anxiety disorder)    • Hand injury     Right   • Hand pain, right    • Migraine    • Nausea 2021   • Otalgia    • Otitis media        Past Psychiatric History:  Began Treatment: 17 yo, started on meds (PCP)  Diagnoses:Depression and Anxiety  Psychiatrist:Denies  Therapist:Denies  Admission History:     Not for psych: 17 yo was having panic attacks, in and out of hospitals for elevated HR, panic attacks    No psych admissions      Medication Trials:  Paxil now: unsure if working, has been on for a few weeks. Worked in the past    Prozac: can't remember why stopped it, but it worked    Zoloft: worked for a while and stopped working      lexapro: didn't work    wellbutrin made anx worse    Has never been on:  abilify  Olanzapine  seroquel  depakote  Lithium  gabapentin    Self Harm: Denies  Suicide Attempts:Denies   Psychosis, Anxiety, Depression: Denies    Substance Abuse History:   Types: vaping, denies all else  Withdrawal Symptoms:Denies  Longest Period Sober:Not Applicable   AA: Not applicable     Social History:  Martial Status: engaged  Employed:No  Kids:No  House:Lives in a house   History: Denies    Social History     Socioeconomic History   • Marital status: Single   Tobacco Use   • Smoking status: Never     Passive exposure: Never   • Smokeless tobacco: Never   Vaping Use   • Vaping Use: Every day   • Substances: Nicotine, Flavoring   • Devices: Disposable   Substance and Sexual Activity   • Alcohol use: Never   • Drug use: Never   • Sexual activity: Yes     Partners: Male     Birth  "control/protection: None       Family History:   Suicide Attempts: Denies  Suicide Completions:Denies      Family History   Problem Relation Age of Onset   • No Known Problems Mother    • No Known Problems Father    • Stroke Paternal Grandmother    • Diabetes Paternal Grandmother         Unspecified   • Stroke Paternal Grandfather    • Diabetes Paternal Grandfather         Unspecified       Developmental History:       Childhood: Denies Abuse  High School:Completed  College: 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[employer]ology school    · Mental Status Exam  · Appearance  · : groomed, good eye contact, normal street clothes  · Behavior  · : pleasant and cooperative  · Motor  · : No abnormal  · Speech  · :normal rhythm, rate, volume, tone, not hyperverbal, not pressured, normal prosidy  · Mood  · : \"much better\"  · Affect  · : euthymic, mood congruent, good variability  · Thought Content  · : negative suicidal ideations, negative homicidal ideations, negative obsessions  · Perceptions  · : negative auditory hallucinations, negative visual hallucinations  · Thought Process  · : linear  · Insight/Judgement  · : Fair/fair  · Cognition  · : grossly intact  · Attention   : intact      Review of Systems:  Review of Systems   Constitutional: Positive for fatigue. Negative for diaphoresis.   HENT: Negative for drooling.    Eyes: Negative for visual disturbance.   Respiratory: Negative for cough and shortness of breath.    Cardiovascular: Positive for palpitations. Negative for chest pain and leg swelling.   Gastrointestinal: Negative for nausea and vomiting.   Endocrine: Negative for cold intolerance and heat intolerance.   Genitourinary: Negative for difficulty urinating.   Musculoskeletal: Negative for joint swelling.   Allergic/Immunologic: Negative for immunocompromised state.   Neurological: Negative for dizziness, seizures, speech difficulty and numbness.       Physical Exam:  Physical Exam    Vital Signs:   There were no vitals taken for this visit.     Lab " Results:   Admission on 10/11/2022, Discharged on 10/11/2022   Component Date Value Ref Range Status   • Rapid Strep A Screen 10/11/2022 Negative   Final   • Internal Control 10/11/2022 Passed   Final   • Lot Number 10/11/2022 [id number]   Final   • Expiration Date 10/11/2022 123,[id number]   Final   • Mono Qual W/Rflx Qn 10/11/2022 Negative  Negative Final    The sensitivity of Heterophile antibody testing is 80-90%.  Armando Barr IgM testing offers higher sensitivity.   • Throat Culture, Beta Strep 10/11/2022 No Beta Hemolytic Streptococcus Isolated   Final   Orders Only on 10/07/2022   Component Date Value Ref Range Status   • Throat Culture, Beta Strep 10/07/2022 No Beta Hemolytic Streptococcus Isolated   Final   Office Visit on 10/07/2022   Component Date Value Ref Range Status   • Rapid Strep A Screen 10/07/2022 Negative  Negative, VALID, INVALID, Not Performed Final   • Internal Control 10/07/2022 Passed  Passed Final   • Lot Number 10/07/2022 [id number]   Final   • Expiration Date 10/07/2022 7,[id number]   Final   Office Visit on 09/30/2022   Component Date Value Ref Range Status   • SARS Antigen 09/30/2022 Not Detected  Not Detected, Presumptive Negative Final   • Internal Control 09/30/2022 Passed  Passed Final   • Lot Number 09/30/2022 [id number]   Final   • Expiration Date 09/30/2022 06/28/2023   Final   • Rapid Influenza A Ag 09/30/2022 Negative  Negative Final   • Rapid Influenza B Ag 09/30/2022 Negative  Negative Final   • Internal Control 09/30/2022 Passed  Passed Final   • Lot Number 09/30/2022 [id number]   Final   • Expiration Date 09/30/2022 07/01/2023   Final   • Rapid Strep A Screen 09/30/2022 Negative  Negative, VALID, INVALID, Not Performed Final   • Internal Control 09/30/2022 Passed  Passed Final   • Lot Number 09/30/2022 [id number]   Final   • Expiration Date 09/30/2022 04/21/2024   Final   • COVID19 09/30/2022 Not Detected  Not Detected - Ref. Range Final   • HCG, Urine, QL 09/30/2022 Negative  Negative Final    • Lot Number 09/30/2022 [id number]   Final   • Internal Positive Control 09/30/2022 Passed  Positive, Passed Final   • Internal Negative Control 09/30/2022 Passed  Negative, Passed Final   • Expiration Date 09/30/2022 10/31/2023   Final   • Methamphetaine Screen, Urine 09/30/2022 Negative  Negative Final-Edited   • POC Amphetamines 09/30/2022 Negative  Negative Corrected   • Barbiturates Screen 09/30/2022 Negative  Negative Final-Edited   • Benzodiazepine Screen 09/30/2022 Positive (A)  Negative Corrected   • Cocaine Screen 09/30/2022 Negative  Negative Final-Edited   • Methadone Screen 09/30/2022 Negative  Negative Final-Edited   • Opiate Screen 09/30/2022 Negative  Negative Final-Edited   • Oxycodone, Screen 09/30/2022 Negative  Negative Final-Edited   • Phencyclidine (PCP) Screen 09/30/2022 Negative  Negative Final-Edited   • Propoxyphene Screen 09/30/2022 Negative  Negative Final-Edited   • THC, Screen 09/30/2022 Negative  Negative Final-Edited   • Tricyclic Antidepressants Screen 09/30/2022 Negative  Negative Final-Edited   Hospital Outpatient Visit on 08/30/2022   Component Date Value Ref Range Status   • Target HR (85%) 08/30/2022 170  bpm Final   • Max. Pred. HR (100%) 08/30/2022 200  bpm Final   Admission on 07/20/2022, Discharged on 07/20/2022   Component Date Value Ref Range Status   • QT Interval 07/20/2022 338  ms Final   • Glucose 07/20/2022 87  65 - 99 mg/dL Final   • BUN 07/20/2022 12  6 - 20 mg/dL Final   • Creatinine 07/20/2022 0.88  0.57 - 1.00 mg/dL Final   • Sodium 07/20/2022 142  136 - 145 mmol/L Final   • Potassium 07/20/2022 3.9  3.5 - 5.2 mmol/L Final   • Chloride 07/20/2022 104  98 - 107 mmol/L Final   • CO2 07/20/2022 27.2  22.0 - 29.0 mmol/L Final   • Calcium 07/20/2022 10.1  8.6 - 10.5 mg/dL Final   • Total Protein 07/20/2022 8.1  6.0 - 8.5 g/dL Final   • Albumin 07/20/2022 5.10  3.50 - 5.20 g/dL Final   • ALT (SGPT) 07/20/2022 11  1 - 33 U/L Final   • AST (SGOT) 07/20/2022 17  1 - 32  U/L Final   • Alkaline Phosphatase 07/20/2022 68  39 - 117 U/L Final   • Total Bilirubin 07/20/2022 0.4  0.0 - 1.2 mg/dL Final   • Globulin 07/20/2022 3.0  gm/dL Final   • A/G Ratio 07/20/2022 1.7  g/dL Final   • BUN/Creatinine Ratio 07/20/2022 13.6  7.0 - 25.0 Final   • Anion Gap 07/20/2022 10.8  5.0 - 15.0 mmol/L Final   • eGFR 07/20/2022 96.6  >60.0 mL/min/1.73 Final    National Kidney Foundation and American Society of Nephrology (ASN) Task Force recommended calculation based on the Chronic Kidney Disease Epidemiology Collaboration (CKD-EPI) equation refit without adjustment for race.   • Magnesium 07/20/2022 2.3 (H)  1.7 - 2.2 mg/dL Final   • Troponin T 07/20/2022 <0.010  0.000 - 0.030 ng/mL Final   • Extra Tube 07/20/2022 Hold for add-ons.   Final    Auto resulted.   • Extra Tube 07/20/2022 hold for add-on   Final    Auto resulted   • Extra Tube 07/20/2022 Hold for add-ons.   Final    Auto resulted.   • Extra Tube 07/20/2022 Hold for add-ons.   Final    Auto resulted   • WBC 07/20/2022 11.99 (H)  3.40 - 10.80 10*3/mm3 Final   • RBC 07/20/2022 5.13  3.77 - 5.28 10*6/mm3 Final   • Hemoglobin 07/20/2022 15.0  12.0 - 15.9 g/dL Final   • Hematocrit 07/20/2022 45.2  34.0 - 46.6 % Final   • MCV 07/20/2022 88.1  79.0 - 97.0 fL Final   • MCH 07/20/2022 29.2  26.6 - 33.0 pg Final   • MCHC 07/20/2022 33.2  31.5 - 35.7 g/dL Final   • RDW 07/20/2022 12.1 (L)  12.3 - 15.4 % Final   • RDW-SD 07/20/2022 39.5  37.0 - 54.0 fl Final   • MPV 07/20/2022 9.9  6.0 - 12.0 fL Final   • Platelets 07/20/2022 217  140 - 450 10*3/mm3 Final   • Neutrophil % 07/20/2022 82.6 (H)  42.7 - 76.0 % Final   • Lymphocyte % 07/20/2022 11.6 (L)  19.6 - 45.3 % Final   • Monocyte % 07/20/2022 4.9 (L)  5.0 - 12.0 % Final   • Eosinophil % 07/20/2022 0.3  0.3 - 6.2 % Final   • Basophil % 07/20/2022 0.3  0.0 - 1.5 % Final   • Immature Grans % 07/20/2022 0.3  0.0 - 0.5 % Final   • Neutrophils, Absolute 07/20/2022 9.89 (H)  1.70 - 7.00 10*3/mm3 Final   •  Lymphocytes, Absolute 07/20/2022 1.39  0.70 - 3.10 10*3/mm3 Final   • Monocytes, Absolute 07/20/2022 0.59  0.10 - 0.90 10*3/mm3 Final   • Eosinophils, Absolute 07/20/2022 0.04  0.00 - 0.40 10*3/mm3 Final   • Basophils, Absolute 07/20/2022 0.04  0.00 - 0.20 10*3/mm3 Final   • Immature Grans, Absolute 07/20/2022 0.04  0.00 - 0.05 10*3/mm3 Final   • nRBC 07/20/2022 0.0  0.0 - 0.2 /100 WBC Final   Admission on 07/18/2022, Discharged on 07/18/2022   Component Date Value Ref Range Status   • QT Interval 07/18/2022 337  ms Final   • Glucose 07/18/2022 102 (H)  65 - 99 mg/dL Final   • BUN 07/18/2022 9  6 - 20 mg/dL Final   • Creatinine 07/18/2022 0.82  0.57 - 1.00 mg/dL Final   • Sodium 07/18/2022 141  136 - 145 mmol/L Final   • Potassium 07/18/2022 3.6  3.5 - 5.2 mmol/L Final   • Chloride 07/18/2022 103  98 - 107 mmol/L Final   • CO2 07/18/2022 27.1  22.0 - 29.0 mmol/L Final   • Calcium 07/18/2022 10.0  8.6 - 10.5 mg/dL Final   • Total Protein 07/18/2022 7.6  6.0 - 8.5 g/dL Final   • Albumin 07/18/2022 4.70  3.50 - 5.20 g/dL Final   • ALT (SGPT) 07/18/2022 11  1 - 33 U/L Final   • AST (SGOT) 07/18/2022 18  1 - 32 U/L Final   • Alkaline Phosphatase 07/18/2022 69  39 - 117 U/L Final   • Total Bilirubin 07/18/2022 0.3  0.0 - 1.2 mg/dL Final   • Globulin 07/18/2022 2.9  gm/dL Final   • A/G Ratio 07/18/2022 1.6  g/dL Final   • BUN/Creatinine Ratio 07/18/2022 11.0  7.0 - 25.0 Final   • Anion Gap 07/18/2022 10.9  5.0 - 15.0 mmol/L Final   • eGFR 07/18/2022 105.2  >60.0 mL/min/1.73 Final    National Kidney Foundation and American Society of Nephrology (ASN) Task Force recommended calculation based on the Chronic Kidney Disease Epidemiology Collaboration (CKD-EPI) equation refit without adjustment for race.   • Magnesium 07/18/2022 1.9  1.7 - 2.2 mg/dL Final   • Troponin T 07/18/2022 <0.010  0.000 - 0.030 ng/mL Final   • Extra Tube 07/18/2022 Hold for add-ons.   Final    Auto resulted.   • Extra Tube 07/18/2022 hold for add-on    Final    Auto resulted   • Extra Tube 07/18/2022 Hold for add-ons.   Final    Auto resulted.   • Extra Tube 07/18/2022 Hold for add-ons.   Final    Auto resulted   • WBC 07/18/2022 12.87 (H)  3.40 - 10.80 10*3/mm3 Final   • RBC 07/18/2022 4.90  3.77 - 5.28 10*6/mm3 Final   • Hemoglobin 07/18/2022 14.5  12.0 - 15.9 g/dL Final   • Hematocrit 07/18/2022 42.9  34.0 - 46.6 % Final   • MCV 07/18/2022 87.6  79.0 - 97.0 fL Final   • MCH 07/18/2022 29.6  26.6 - 33.0 pg Final   • MCHC 07/18/2022 33.8  31.5 - 35.7 g/dL Final   • RDW 07/18/2022 12.6  12.3 - 15.4 % Final   • RDW-SD 07/18/2022 39.9  37.0 - 54.0 fl Final   • MPV 07/18/2022 10.2  6.0 - 12.0 fL Final   • Platelets 07/18/2022 185  140 - 450 10*3/mm3 Final   • Neutrophil % 07/18/2022 78.0 (H)  42.7 - 76.0 % Final   • Lymphocyte % 07/18/2022 14.8 (L)  19.6 - 45.3 % Final   • Monocyte % 07/18/2022 6.2  5.0 - 12.0 % Final   • Eosinophil % 07/18/2022 0.4  0.3 - 6.2 % Final   • Basophil % 07/18/2022 0.4  0.0 - 1.5 % Final   • Immature Grans % 07/18/2022 0.2  0.0 - 0.5 % Final   • Neutrophils, Absolute 07/18/2022 10.05 (H)  1.70 - 7.00 10*3/mm3 Final   • Lymphocytes, Absolute 07/18/2022 1.90  0.70 - 3.10 10*3/mm3 Final   • Monocytes, Absolute 07/18/2022 0.80  0.10 - 0.90 10*3/mm3 Final   • Eosinophils, Absolute 07/18/2022 0.05  0.00 - 0.40 10*3/mm3 Final   • Basophils, Absolute 07/18/2022 0.05  0.00 - 0.20 10*3/mm3 Final   • Immature Grans, Absolute 07/18/2022 0.02  0.00 - 0.05 10*3/mm3 Final   • nRBC 07/18/2022 0.0  0.0 - 0.2 /100 WBC Final   • Color, UA 07/18/2022 Yellow  Yellow, Straw Final   • Appearance, UA 07/18/2022 Clear  Clear Final   • pH, UA 07/18/2022 6.5  5.0 - 8.0 Final   • Specific Gravity, UA 07/18/2022 <=1.005  1.005 - 1.030 Final   • Glucose, UA 07/18/2022 Negative  Negative Final   • Ketones, UA 07/18/2022 Negative  Negative Final   • Bilirubin, UA 07/18/2022 Negative  Negative Final   • Blood, UA 07/18/2022 Negative  Negative Final   • Protein, UA  07/18/2022 Negative  Negative Final   • Leuk Esterase, UA 07/18/2022 Negative  Negative Final   • Nitrite, UA 07/18/2022 Negative  Negative Final   • Urobilinogen, UA 07/18/2022 0.2 E.U./dL  0.2 - 1.0 E.U./dL Final   • Amphet/Methamphet, Screen 07/18/2022 Negative  Negative Final   • Barbiturates Screen, Urine 07/18/2022 Negative  Negative Final   • Benzodiazepine Screen, Urine 07/18/2022 Negative  Negative Final   • Cocaine Screen, Urine 07/18/2022 Negative  Negative Final   • Opiate Screen 07/18/2022 Negative  Negative Final   • THC, Screen, Urine 07/18/2022 Negative  Negative Final   • Methadone Screen, Urine 07/18/2022 Negative  Negative Final   • Oxycodone Screen, Urine 07/18/2022 Negative  Negative Final   • TSH 07/18/2022 0.713  0.270 - 4.200 uIU/mL Final   • Free T4 07/18/2022 1.58  0.93 - 1.70 ng/dL Final   Office Visit on 05/25/2022   Component Date Value Ref Range Status   • Rapid Influenza A Ag 05/25/2022 Negative  Negative Final   • Rapid Influenza B Ag 05/25/2022 Negative  Negative Final   • Internal Control 05/25/2022 Passed  Passed Final   • Lot Number 05/25/2022 [id number]   Final   • Expiration Date 05/25/2022 05/15/2023   Final   • Rapid Strep A Screen 05/25/2022 Negative  Negative, VALID, INVALID, Not Performed Final   • Internal Control 05/25/2022 Passed  Passed Final   • Lot Number 05/25/2022 [id number]   Final   • Expiration Date 05/25/2022 12/09/2023   Final   • SARS Antigen 05/25/2022 Not Detected  Not Detected, Presumptive Negative Final   • Internal Control 05/25/2022 Passed  Passed Final   • Lot Number 05/25/2022 [id number]   Final   • Expiration Date 05/25/2022 09/12/2023   Final       EKG Results:  No orders to display       Imaging Results:  XR Chest 1 View    Result Date: 7/20/2022    1. No acute cardiopulmonary disease       Derek Garcia M.D.       Electronically Signed and Approved By: Derek Garcia M.D. on 7/20/2022 at 17:44               Assessment & Plan   Diagnoses and all orders for  this visit:    1. Panic disorder (Primary)    2. Panic attacks    3. Generalized anxiety disorder    4. Major depressive disorder, recurrent episode, moderate (HCC)    5. Anxiety  -     PARoxetine (Paxil) 30 MG tablet; Take 1 tablet by mouth Every Morning.  Dispense: 30 tablet; Refill: 2        Visit Diagnoses:    ICD-10-CM ICD-9-CM   1. Panic disorder  F41.0 300.01   2. Panic attacks  F41.0 300.01   3. Generalized anxiety disorder  F41.1 300.02   4. Major depressive disorder, recurrent episode, moderate (HCC)  F33.1 296.32   5. Anxiety  F41.9 300.00     10/21: Patient is much better, still residual anxiety symptoms.  Also has a higher heart rate in the mornings and wants to target this by increasing metoprolol.  I counseled her to follow up with her primary care about that.  For now, target anxiety by increasing Paxil.  Patient is taking Ativan scheduled, would likely want to wean her off this slowly over time as she feels her anxiety is completely under control on the regimen she is on, which is not the case for now, although we are close.  Consider adding BuSpar.  7 minutes of supportive psychotherapy with goal to strengthen defenses, promote problems solving, restore adaptive functioning and provide symptom relief. The therapeutic alliance was strengthened to encourage the patient to express their thoughts and feelings. Esteem building was enhanced through praise, reassurance, normalizing and encouragement. Coping skills were enhanced to build distress tolerance skills and emotional regulation. Allowed patient to freely discuss issues without interruption or judgement with unconditional positive regard, active listening skills, and empathy. Provided a safe, confidential environment to facilitate the development of a positive therapeutic relationship and encourage open, honest communication. Assisted patient in identifying risk factors which would indicate the need for higher level of care including thoughts to  harm self or others and/or self-harming behavior and encouraged patient to contact this office, call 911, or present to the nearest emergency room should any of these events occur. Assisted patient in processing session content; acknowledged and normalized patient’s thoughts, feelings, and concerns by utilizing a person-centered approach in efforts to build appropriate rapport and a positive therapeutic relationship with open and honest communication. Patient given education on medication side effects, diagnosis/illness and relapse symptoms. Plan to continue supportive psychotherapy in next appointment to provide symptom relief.  Diagnoses: as above  Symptoms: as above  Functional status: Good  Mental Status Exam: as above    Treatment plan: Medication management and supportive psychotherapy  Prognosis: Good  Progress: Improving  6 weeks    9/23: Start therapy at [facility], increase Paxil, continue Ativan.  Low threshold to start scheduled gabapentin to target anxiety.  Also consider a mood stabilizer such as Abilify.  4 weeks    PLAN:  28. Safety: No acute safety concerns  29. Therapy: Referral Made  30. Risk Assessment: Risk of self-harm acutely is moderate.  Risk factors include anxiety disorder, mood disorder, some AODA, and recent psychosocial stressors (pandemic). Protective factors include no family history, denies access to guns/weapons, no present SI, no history of suicide attempts or self-harm in the past, healthcare seeking, future orientation, willingness to engage in care.  Risk of self-harm chronically is also moderate, but could be further elevated in the event of treatment noncompliance and/or AODA.  31. Meds:  a. Increase Paxil 20 to 30 mg daily. Risks, benefits, alternatives discussed with patient including GI upset, nausea vomiting diarrhea, theoretical decrease of seizure threshold predisposing the patient to a slightly higher seizure risk, headaches, sexual dysfunction, serotonin syndrome,  bleeding risk, increased suicidality in patients 24 years and younger.  After discussion of these risks and benefits, the patient voiced understanding and agreed to proceed.  b. CONTINUE Ativan 0.5 mg p.o. twice daily. Risks, benefits, alternatives discussed with patient including GI upset, sedation, dizziness, respiratory depression, falls risk.  After discussion of these risks and benefits, the patient voiced understanding and agreed to proceed. Christina ordered. UDS ordered.  32. Labs: None  33. Follow up: 6 weeks    Patient screened positive for depression based on a PHQ-9 score of 0 on 3/9/2022. Follow-up recommendations include: Prescribed antidepressant medication treatment and Suicide Risk Assessment performed.           TREATMENT PLAN/GOALS: Continue supportive psychotherapy efforts and medications as indicated. Treatment and medication options discussed during today's visit. Patient acknowledged and verbally consented to continue with current treatment plan and was educated on the importance of compliance with treatment and follow-up appointments.    MEDICATION ISSUES:  CHRISTINA reviewed as expected.  Discussed medication options and treatment plan of prescribed medication as well as the risks, benefits, and side effects including potential falls, possible impaired driving and metabolic adversities among others. Patient is agreeable to call the office with any worsening of symptoms or onset of side effects. Patient is agreeable to call 911 or go to the nearest ER should he/she begin having SI/HI. No medication side effects or related complaints today.     MEDS ORDERED DURING VISIT:  New Medications Ordered This Visit   Medications   • PARoxetine (Paxil) 30 MG tablet     Sig: Take 1 tablet by mouth Every Morning.     Dispense:  30 tablet     Refill:  2       Return in about 6 weeks (around 12/2/2022).         This document has been electronically signed by Jocy Benitez MD  October 21, 2022 14:26  EDT    Dictated Utilizing Dragon Dictation: Part of this note may be an electronic transcription/translation of spoken language to printed text using the Dragon Dictation System.   51 year old male pt with PMHx of HTN, HLD, DMT2(last A1C 8.9 on 2/10/2017) who presents for cardiac cath. Pt reports that he has been experiencing bilateral shoulder pain evaluated by PCP Dr. SCARLETT Rucker and referred for cardiologist Dr. Rich.   Stress test revealed medium sized moderate ro severe defect in the basal to inferior wall that is predominantly fixed consistent with infarction with minimal carlos infarct ischemia. EF 44% 51 year old male pt with PMHx of HTN, HLD, DMT2(last A1C 8.9 on 2/10/2017) who presents for cardiac cath. Pt reports that he has been experiencing bilateral shoulder pain, evaluated by PCP Dr. SCARLETT Rucker and referred for cardiologist Dr. Rich. Stress test revealed medium sized moderate ro severe defect in the basal to inferior wall that is predominantly fixed consistent with infarction with minimal carlos infarct ischemia, EF 44%. Admitted to [facility] for cardiac cath.    3/2 cardiac cath with stents to the distal left circ, distal RCA, mid RCA and prox RCA. Right radial insertion site without swelling, bleeding.

## 2023-03-22 NOTE — ASU PREOP CHECKLIST - SURGICAL CONSENT
Subjective     Subjective    Unchanged from yesterday.  Still has mild dyspnea but no worse  Review of Systems     No chills, fevers, or sweats. No nausea, vomiting, or diarrhea. No urinary symptoms. No new joint pain or skin changes.    Objective     Objective   Visit Vitals  /70   Pulse 74   Temp 97.3 °F (36.3 °C) (Oral)   Resp 16   Ht 4' 10\" (1.473 m)   Wt 66 kg (145 lb 8.1 oz)   SpO2 97%   BMI 30.41 kg/m²     Oxygen flowing at 2 L/min    Physical Exam  Examination of the patient reveals:     No distress at rest. Conjunctiva pink and sclera anicteric. No JVD. Breath sounds clear. Heart rhythm regular. Abdomen soft and nontender. No hepatosplenomegaly. Skin is warm, dry, and intact. No clubbing, cyanosis, or significant edema      I/O's       Intake/Output Summary (Last 24 hours) at 3/22/2023 0922  Last data filed at 3/21/2023 1738  Gross per 24 hour   Intake 648.68 ml   Output --   Net 648.68 ml         Labs     Recent Labs   Lab 03/22/23  0605 03/21/23  0414 03/20/23  0537   WBC 12.2* 13.7* 8.4   HCT 26.4* 25.2* 23.5*   HGB 8.0* 7.4* 7.4*    370 386     Recent Labs   Lab 03/22/23  0605 03/21/23  1448 03/21/23  0414 03/20/23  0537   SODIUM 141  --  141 144   POTASSIUM 4.1  --  5.0 4.4   CHLORIDE 115*  --  115* 117*   CO2 16*  --  15* 18*   GLUCOSE 91  --  232* 99   BUN 24*  --  24* 17   CREATININE 1.67*  --  1.62* 1.16*   INR  --  1.3 1.4  --          Imaging     CTA CHEST PULMONARY EMBOLISM   Final Result      Acute pulmonary embolism in the posterior segmental branch of the right   lower lobar artery.  Finding was discussed with Dr. Prabhakar of the emergency   department at 6:48 AM on 03/21/2023 via telephone by Dr. Kennedy.      Moderate bilateral layering pleural effusions.      Prominent subcarinal lymph node, nonspecific, probably reactive in nature.    Otherwise, no thoracic lymphadenopathy.      Severe compression deformity of the T12 vertebral body mild retropulsion of   the posterior cortex  resulting in mild central spinal canal stenosis.  This   is age indeterminate, probably chronic.  Please correlate with point   tenderness.      Electronically Signed by: Bucky Kennedy DO    Signed on: 3/21/2023 6:54 AM    Workstation ID: [id number]      XR CHEST AP OR PA   Final Result   1.  Mild vascular prominence.   2.  Small right pleural effusion.      Electronically Signed by: MARLENE LERNER M.D.    Signed on: 3/21/2023 7:35 AM    Workstation ID: [id number]            Diagnosis   Pulmonary embolism I26.99  --Recent hospitalizations and immobilization  Pleural effusion J90  Severe ischemic cardiomyopathy I25.5  --Related to recent STEMI     Positive urinalysis.    Very elevated procal to 4.96  Plan   Continue antibiotics for 7 day course     IV heparin as ordered.  Eventual transition to Full dose apixaban, If cardiology agrees.  Keep O2 saturation 92 to 95%      DVT Prophylaxis  apixaBAN Tab - 2.5 MG  heparin (porcine) - 25,000 units/250 mL in dextrose 5 %      Inpatient Medications     Current Facility-Administered Medications   Medication   • magnesium sulfate 2 g in 50 mL premix IVPB   • heparin (porcine) 25,000 units/250 mL in dextrose 5 % infusion   • heparin (porcine) injection 2,500 Units   • heparin (porcine) injection 5,100 Units   • acetaminophen (TYLENOL) tablet 650 mg   • ondansetron (ZOFRAN) injection 4 mg   • ipratropium-albuterol (DUONEB) 0.5-2.5 (3) MG/3ML nebulizer solution 3 mL   • guaiFENesin-DM (ROBITUSSIN DM) 100-10 MG/5ML syrup 10 mL   • cefTRIAXone (ROCEPHIN) syringe 1,000 mg   • aspirin (ECOTRIN) enteric coated tablet 81 mg   • atorvastatin (LIPITOR) tablet 80 mg   • furosemide (LASIX) tablet 20 mg   • leflunomide (ARAVA) tablet 15 mg   • levothyroxine (SYNTHROID, LEVOTHROID) tablet 88 mcg   • pregabalin (LYRICA) capsule 25 mg   • sertraline (ZOLOFT) tablet 100 mg   • ticagrelor (BRILINTA) tablet 90 mg   • docusate sodium-sennosides (SENOKOT S) 50-8.6 MG 1 tablet   • dextrose 50 %  injection 25 g   • dextrose 50 % injection 12.5 g   • glucagon (GLUCAGEN) injection 1 mg   • dextrose (GLUTOSE) 40 % gel 15 g   • dextrose (GLUTOSE) 40 % gel 30 g   • insulin lispro (ADMELOG,HumaLOG) - Correction Dose   • insulin lispro (ADMELOG,HumaLOG) - Correction Dose   • cholecalciferol (VITAMIN D) tablet 25 mcg               done

## 2023-09-27 ENCOUNTER — OFFICE (OUTPATIENT)
Dept: URBAN - METROPOLITAN AREA CLINIC 27 | Facility: CLINIC | Age: 58
Setting detail: OPHTHALMOLOGY
End: 2023-09-27
Payer: COMMERCIAL

## 2023-09-27 DIAGNOSIS — H16.042: ICD-10-CM

## 2023-09-27 DIAGNOSIS — H16.423: ICD-10-CM

## 2023-09-27 PROCEDURE — 92002 INTRM OPH EXAM NEW PATIENT: CPT | Performed by: OPHTHALMOLOGY

## 2023-09-27 ASSESSMENT — TONOMETRY
OD_IOP_MMHG: 12
OS_IOP_MMHG: 11

## 2023-09-27 ASSESSMENT — VASCULARIZATION
OD_VASCULARIZATION: PANNUS
OS_VASCULARIZATION: PANNUS

## 2023-09-27 ASSESSMENT — REFRACTION_AUTOREFRACTION
OD_AXIS: 159
OD_CYLINDER: +0.50
OS_SPHERE: -9.00
OD_SPHERE: -3.50
OS_AXIS: 075
OS_CYLINDER: +0.75

## 2023-09-27 ASSESSMENT — AXIALLENGTH_DERIVED
OS_AL: 26.8999
OD_AL: 24.5264

## 2023-09-27 ASSESSMENT — CONFRONTATIONAL VISUAL FIELD TEST (CVF)
OS_FINDINGS: FULL
OD_FINDINGS: FULL

## 2023-09-27 ASSESSMENT — KERATOMETRY
OD_K1POWER_DIOPTERS: 44.00
OD_K2POWER_DIOPTERS: 45.00
OS_K2POWER_DIOPTERS: 45.25
OS_AXISANGLE_DEGREES: 024
METHOD_AUTO_MANUAL: AUTO
OS_K1POWER_DIOPTERS: 44.25
OD_AXISANGLE_DEGREES: 140

## 2023-09-27 ASSESSMENT — SPHEQUIV_DERIVED
OD_SPHEQUIV: -3.25
OS_SPHEQUIV: -8.625

## 2023-09-27 ASSESSMENT — VISUAL ACUITY
OS_BCVA: 20/20-1
OD_BCVA: 20/40

## 2023-09-29 ENCOUNTER — OFFICE (OUTPATIENT)
Dept: URBAN - METROPOLITAN AREA CLINIC 34 | Facility: CLINIC | Age: 58
Setting detail: OPHTHALMOLOGY
End: 2023-09-29
Payer: COMMERCIAL

## 2023-09-29 DIAGNOSIS — H00.025: ICD-10-CM

## 2023-09-29 DIAGNOSIS — H16.042: ICD-10-CM

## 2023-09-29 DIAGNOSIS — H00.022: ICD-10-CM

## 2023-09-29 PROCEDURE — 99213 OFFICE O/P EST LOW 20 MIN: CPT | Performed by: OPHTHALMOLOGY

## 2023-09-29 ASSESSMENT — AXIALLENGTH_DERIVED
OS_AL: 26.7846
OD_AL: 24.5355

## 2023-09-29 ASSESSMENT — KERATOMETRY
OD_AXISANGLE_DEGREES: 150
OD_K2POWER_DIOPTERS: 45.25
OD_K1POWER_DIOPTERS: 44.50
METHOD_AUTO_MANUAL: AUTO
OS_K1POWER_DIOPTERS: 44.25
OS_AXISANGLE_DEGREES: 056
OS_K2POWER_DIOPTERS: 46.00

## 2023-09-29 ASSESSMENT — LID EXAM ASSESSMENTS
OD_MEIBOMITIS: RLL 2+
OS_MEIBOMITIS: LLL 2+

## 2023-09-29 ASSESSMENT — SPHEQUIV_DERIVED
OD_SPHEQUIV: -3.625
OS_SPHEQUIV: -8.75

## 2023-09-29 ASSESSMENT — CONFRONTATIONAL VISUAL FIELD TEST (CVF)
OS_FINDINGS: FULL
OD_FINDINGS: FULL

## 2023-09-29 ASSESSMENT — REFRACTION_AUTOREFRACTION
OS_CYLINDER: +2.00
OS_SPHERE: -9.75
OD_SPHERE: -4.00
OD_CYLINDER: +0.75
OD_AXIS: 159
OS_AXIS: 051

## 2023-09-29 ASSESSMENT — VASCULARIZATION
OS_VASCULARIZATION: PANNUS
OD_VASCULARIZATION: PANNUS

## 2023-09-29 ASSESSMENT — VISUAL ACUITY
OD_BCVA: 20/150
OS_BCVA: 20/150

## 2023-10-09 ENCOUNTER — OFFICE (OUTPATIENT)
Dept: URBAN - METROPOLITAN AREA CLINIC 34 | Facility: CLINIC | Age: 58
Setting detail: OPHTHALMOLOGY
End: 2023-10-09
Payer: COMMERCIAL

## 2023-10-09 DIAGNOSIS — H16.422: ICD-10-CM

## 2023-10-09 DIAGNOSIS — H17.9: ICD-10-CM

## 2023-10-09 DIAGNOSIS — H00.022: ICD-10-CM

## 2023-10-09 DIAGNOSIS — H00.025: ICD-10-CM

## 2023-10-09 PROBLEM — H16.042 CORNEAL ULCER; LEFT EYE: Status: RESOLVED | Noted: 2023-09-27 | Resolved: 2023-10-09

## 2023-10-09 PROCEDURE — 99213 OFFICE O/P EST LOW 20 MIN: CPT | Performed by: OPHTHALMOLOGY

## 2023-10-09 ASSESSMENT — SPHEQUIV_DERIVED
OS_SPHEQUIV: -7.875
OD_SPHEQUIV: 0.875

## 2023-10-09 ASSESSMENT — VISUAL ACUITY
OS_BCVA: 20/25-2
OD_BCVA: 20/400

## 2023-10-09 ASSESSMENT — REFRACTION_AUTOREFRACTION
OD_AXIS: 155
OS_SPHERE: -8.50
OS_AXIS: 177
OS_CYLINDER: +1.25
OD_SPHERE: +0.50
OD_CYLINDER: +0.75

## 2023-10-09 ASSESSMENT — CONFRONTATIONAL VISUAL FIELD TEST (CVF)
OS_FINDINGS: FULL
OD_FINDINGS: FULL

## 2023-10-09 ASSESSMENT — TONOMETRY
OS_IOP_MMHG: 16
OD_IOP_MMHG: 16

## 2023-10-09 ASSESSMENT — KERATOMETRY
METHOD_AUTO_MANUAL: AUTO
OS_AXISANGLE_DEGREES: 019
OD_K2POWER_DIOPTERS: 42.00
OD_K1POWER_DIOPTERS: 41.25
OS_K2POWER_DIOPTERS: 45.00
OD_AXISANGLE_DEGREES: 134
OS_K1POWER_DIOPTERS: 44.25

## 2023-10-09 ASSESSMENT — VASCULARIZATION
OS_VASCULARIZATION: PANNUS
OD_VASCULARIZATION: PANNUS

## 2023-10-09 ASSESSMENT — AXIALLENGTH_DERIVED
OD_AL: 23.9449
OS_AL: 26.5835

## 2024-04-22 NOTE — H&P PST ADULT - PT NEEDS ASSIST
Refill for gabapentin 300 mg 2 times a day and clonazepam 0.125 daily to [facility]   no

## 2024-08-21 NOTE — H&P CARDIOLOGY - SPO2 (%)
Problem: Patient Centered Care  Goal: Patient preferences are identified and integrated in the patient's plan of care  Description: Interventions:  - What would you like us to know as we care for you? \"I am from home with my wife.\"  - Provide timely, complete, and accurate information to patient/family  - Incorporate patient and family knowledge, values, beliefs, and cultural backgrounds into the planning and delivery of care  - Encourage patient/family to participate in care and decision-making at the level they choose  - Honor patient and family perspectives and choices  Outcome: Progressing     Problem: Patient/Family Goals  Goal: Patient/Family Long Term Goal  Description: Patient's Long Term Goal: \"to be discharge\"    Interventions:  -Follow up MD appt  -Take meds as prescribed  -Use of assistive device if needed  - See additional Care Plan goals for specific interventions  Outcome: Progressing  Goal: Patient/Family Short Term Goal  Description: Patient's Short Term Goal: \"to prevent pain and infection\"    Interventions:   -Monitor VS  -Check labs results  -Administer IVF and IV ABT as ordered  -Provide pain meds as prescribed  - See additional Care Plan goals for specific interventions  Outcome: Progressing     Problem: GASTROINTESTINAL - ADULT  Goal: Minimal or absence of nausea and vomiting  Description: INTERVENTIONS:  - Maintain adequate hydration with IV or PO as ordered and tolerated  - Nasogastric tube to low intermittent suction as ordered  - Evaluate effectiveness of ordered antiemetic medications  - Provide nonpharmacologic comfort measures as appropriate  - Advance diet as tolerated, if ordered  - Obtain nutritional consult as needed  - Evaluate fluid balance  Outcome: Progressing  Goal: Maintains or returns to baseline bowel function  Description: INTERVENTIONS:  - Assess bowel function  - Maintain adequate hydration with IV or PO as ordered and tolerated  - Evaluate effectiveness of GI  medications  - Encourage mobilization and activity  - Obtain nutritional consult as needed  - Establish a toileting routine/schedule  - Consider collaborating with pharmacy to review patient's medication profile  Outcome: Progressing     Problem: PAIN - ADULT  Goal: Verbalizes/displays adequate comfort level or patient's stated pain goal  Description: INTERVENTIONS:  - Encourage pt to monitor pain and request assistance  - Assess pain using appropriate pain scale  - Administer analgesics based on type and severity of pain and evaluate response  - Implement non-pharmacological measures as appropriate and evaluate response  - Consider cultural and social influences on pain and pain management  - Manage/alleviate anxiety  - Utilize distraction and/or relaxation techniques  - Monitor for opioid side effects  - Notify MD/LIP if interventions unsuccessful or patient reports new pain  - Anticipate increased pain with activity and pre-medicate as appropriate  Outcome: Progressing     Problem: RISK FOR INFECTION - ADULT  Goal: Absence of fever/infection during anticipated neutropenic period  Description: INTERVENTIONS  - Monitor WBC  - Administer growth factors as ordered  - Implement neutropenic guidelines  Outcome: Not Progressing patient declining antibiotics   Scheduled medications given- see MAR, bed at lowest position, belongings and call light within reach. Frequent staff rounding.        98

## 2024-12-16 ENCOUNTER — NON-APPOINTMENT (OUTPATIENT)
Age: 59
End: 2024-12-16

## 2025-02-05 ENCOUNTER — NON-APPOINTMENT (OUTPATIENT)
Age: 60
End: 2025-02-05

## 2025-05-02 NOTE — ASU PATIENT PROFILE, ADULT - DOES PATIENT HAVE ADVANCE DIRECTIVE
Vascular surgery saw per Podiatry's recommendation = they will see in the office.  They advised start ASA but after the d/w the PA in relation to his vitreous hemorrhages, can hold off on that and d/w him in the office.  This will give him time to see the Retina specialist as OP as about getting tx   Yes